# Patient Record
Sex: FEMALE | Race: BLACK OR AFRICAN AMERICAN | NOT HISPANIC OR LATINO | URBAN - METROPOLITAN AREA
[De-identification: names, ages, dates, MRNs, and addresses within clinical notes are randomized per-mention and may not be internally consistent; named-entity substitution may affect disease eponyms.]

---

## 2024-04-06 ENCOUNTER — HOSPITAL ENCOUNTER (EMERGENCY)
Facility: HOSPITAL | Age: 59
Discharge: HOME | End: 2024-04-06
Attending: EMERGENCY MEDICINE
Payer: MEDICAID

## 2024-04-06 VITALS
SYSTOLIC BLOOD PRESSURE: 138 MMHG | TEMPERATURE: 98 F | BODY MASS INDEX: 35.33 KG/M2 | OXYGEN SATURATION: 99 % | HEART RATE: 85 BPM | RESPIRATION RATE: 16 BRPM | DIASTOLIC BLOOD PRESSURE: 71 MMHG | WEIGHT: 192 LBS | HEIGHT: 62 IN

## 2024-04-06 DIAGNOSIS — M79.672 LEFT FOOT PAIN: Primary | ICD-10-CM

## 2024-04-06 PROCEDURE — 25000003 PHARM REV CODE 250: Mod: ER | Performed by: NURSE PRACTITIONER

## 2024-04-06 PROCEDURE — 99284 EMERGENCY DEPT VISIT MOD MDM: CPT | Mod: 25,ER

## 2024-04-06 PROCEDURE — 63600175 PHARM REV CODE 636 W HCPCS: Mod: ER | Performed by: NURSE PRACTITIONER

## 2024-04-06 PROCEDURE — 96372 THER/PROPH/DIAG INJ SC/IM: CPT | Performed by: NURSE PRACTITIONER

## 2024-04-06 RX ORDER — NAPROXEN 250 MG/1
500 TABLET ORAL
Status: COMPLETED | OUTPATIENT
Start: 2024-04-06 | End: 2024-04-06

## 2024-04-06 RX ORDER — NAPROXEN 500 MG/1
500 TABLET ORAL 2 TIMES DAILY WITH MEALS
Qty: 14 TABLET | Refills: 0 | Status: SHIPPED | OUTPATIENT
Start: 2024-04-06 | End: 2024-04-06

## 2024-04-06 RX ORDER — NAPROXEN 500 MG/1
500 TABLET ORAL 2 TIMES DAILY WITH MEALS
Qty: 14 TABLET | Refills: 0 | Status: SHIPPED | OUTPATIENT
Start: 2024-04-06

## 2024-04-06 RX ORDER — METHYLPREDNISOLONE SOD SUCC 125 MG
125 VIAL (EA) INJECTION
Status: COMPLETED | OUTPATIENT
Start: 2024-04-06 | End: 2024-04-06

## 2024-04-06 RX ADMIN — NAPROXEN 500 MG: 250 TABLET ORAL at 11:04

## 2024-04-06 RX ADMIN — METHYLPREDNISOLONE SODIUM SUCCINATE 125 MG: 125 INJECTION, POWDER, FOR SOLUTION INTRAMUSCULAR; INTRAVENOUS at 11:04

## 2024-04-06 NOTE — ED TRIAGE NOTES
"Pt c/o right foot pain and swelling for months. Pt states "I need to find out what it is. It just keep paining me." Pt ambulatory and limping. Not applying full weight to foot. Pt AAO x4. VSS   "

## 2024-04-06 NOTE — ED PROVIDER NOTES
Encounter Date: 4/6/2024       History     Chief Complaint   Patient presents with    Foot Pain     A 57 y/o female presents to the ED c/o chronic (left) foot pain. Pain intensified over the past few days. No home treatment.      Chief complaint:  Left foot pain    History of present illness: Patient is a 58-year-old female who reports atraumatic pain to the left foot that started yesterday.  She reports the pain has worsened there is associated swelling and difficulty walking.  She reports this happens periodically and that when it is happening she has the same issues exactly.  It usually self resolves without therapy.  She reports no medications or treatments have been used.    The history is provided by the patient. No  was used.     Review of patient's allergies indicates:  No Known Allergies  No past medical history on file.  No past surgical history on file.  No family history on file.     Review of Systems   Constitutional:  Negative for chills, fatigue and fever.   HENT:  Negative for congestion, ear discharge, ear pain, postnasal drip, rhinorrhea, sinus pressure, sneezing, sore throat and voice change.    Eyes:  Negative for discharge and itching.   Respiratory:  Negative for cough, shortness of breath and wheezing.    Cardiovascular:  Negative for chest pain, palpitations and leg swelling.   Gastrointestinal:  Negative for abdominal pain, constipation, diarrhea, nausea and vomiting.   Endocrine: Negative for polydipsia, polyphagia and polyuria.   Genitourinary:  Negative for dysuria, frequency, hematuria, urgency, vaginal bleeding, vaginal discharge and vaginal pain.   Musculoskeletal:  Positive for arthralgias and joint swelling. Negative for myalgias.   Skin:  Negative for rash and wound.   Neurological:  Negative for dizziness, seizures, syncope, weakness and numbness.   Hematological:  Negative for adenopathy. Does not bruise/bleed easily.   Psychiatric/Behavioral:  Negative for  self-injury and suicidal ideas. The patient is not nervous/anxious.        Physical Exam     Initial Vitals   BP Pulse Resp Temp SpO2   04/06/24 1011 04/06/24 1010 04/06/24 1010 04/06/24 1010 04/06/24 1010   (!) 144/75 91 18 98.2 °F (36.8 °C) 95 %      MAP       --                Physical Exam    Nursing note and vitals reviewed.  Constitutional: She appears well-developed and well-nourished.   HENT:   Head: Normocephalic and atraumatic.   Right Ear: External ear normal.   Left Ear: External ear normal.   Nose: Nose normal.   Eyes: Conjunctivae and EOM are normal. Pupils are equal, round, and reactive to light. Right eye exhibits no discharge. Left eye exhibits no discharge.   Neck:   Normal range of motion.  Abdominal: She exhibits no distension.   Musculoskeletal:         General: Normal range of motion.      Cervical back: Normal range of motion.      Left foot: Normal range of motion and normal capillary refill. Swelling (Of the dorsal side of the foot without redness or warmth or tenderness.) present. No deformity, bunion, Charcot foot, foot drop, prominent metatarsal heads, laceration, tenderness, bony tenderness or crepitus. Normal pulse.     Neurological: She is alert and oriented to person, place, and time.   Skin: Skin is dry. Capillary refill takes less than 2 seconds.         ED Course   Procedures  Labs Reviewed - No data to display       Imaging Results              X-Ray Foot Complete Left (Final result)  Result time 04/06/24 11:06:52      Final result by Adrien Francisco MD (04/06/24 11:06:52)                   Impression:      No convincing evidence of acute fracture or dislocation.      Electronically signed by: Adrien Francisco  Date:    04/06/2024  Time:    11:06               Narrative:    EXAMINATION:  XR FOOT COMPLETE 3 VIEW LEFT    CLINICAL HISTORY:  .  Pain in left foot    TECHNIQUE:  AP, lateral and oblique views of the left foot were performed.    COMPARISON:  None    FINDINGS:  No  definite evidence of acute fracture or dislocation.  Lisfranc joint appears congruent.  Joint spaces appear maintained.  Mild DJD of the great toe MTP joint.  Degenerate spurring is noted at the dorsal midfoot.    Minor enthesopathic change at the calcaneus.    Relatively diffuse soft tissue swelling.  Reticulation of subcutaneous fat planes could relate to edema, noting that cellulitis could appear similar.    No definite radiopaque foreign body.                                       Medications   methylPREDNISolone sodium succinate injection 125 mg (has no administration in time range)   naproxen tablet 500 mg (has no administration in time range)     Medical Decision Making  Patient is a 58-year-old female who reports atraumatic pain to the left foot that started yesterday.  She reports the pain has worsened there is associated swelling and difficulty walking.  She reports this happens periodically and that when it is happening she has the same issues exactly.  It usually self resolves without therapy.  She reports no medications or treatments have been used.    On physical exam there is swelling of the dorsal aspect of the foot without redness or warmth.  Distal pulse sensation and movement are intact.  Able to flex and extend of the ankle without difficulty or assistance.  Achilles tendon without abnormality.      Differential diagnosis includes plantar fasciitis, fracture dislocation sprain strain    Problems Addressed:  Left foot pain: acute illness or injury     Details: This does not represent septic joint plantar fasciitis fracture dislocation or sprain or strain.  It may represent a tendinopathy.  I will give the patient's steroids and anti-inflammatories and have her follow-up with podiatry.  A referral has been entered and a prescription written for naproxen.    Amount and/or Complexity of Data Reviewed  Radiology: ordered. Decision-making details documented in ED Course.  Discussion of management or  "test interpretation with external provider(s): Vital signs at the time of disposition were:  BP (!) 144/75 (BP Location: Right arm, Patient Position: Sitting)   Pulse 91   Temp 98.2 °F (36.8 °C) (Oral)   Resp 18   Ht 5' 2" (1.575 m)   Wt 87.1 kg (192 lb)   LMP  (Approximate)   SpO2 95%   BMI 35.12 kg/m²       See AVS for additional recommendations. Medications listed herein were prescribed after reviewing the patient's allergies, medication list, history, most recent laboratories as available.  Referrals below were provided after reviewing the patient's previous medical providers. She understands she  should return for any worsening or changes in condition.  Prior to discharge the patient was asked if she  had any additional concerns or complaints and she declined. The patient was given an opportunity to ask questions and all were answered to her satisfaction.     Risk  Prescription drug management.               ED Course as of 04/06/24 1120   Sat Apr 06, 2024   1041 BP(!): 144/75 [VC]   1041 Temp: 98.2 °F (36.8 °C) [VC]   1041 Temp Source: Oral [VC]   1041 Pulse: 91 [VC]   1041 Resp: 18 [VC]   1041 SpO2: 95 % [VC]      ED Course User Index  [VC] Teto Martinez DNP                           Clinical Impression:  Final diagnoses:  [M79.672] Left foot pain (Primary)          ED Disposition Condition    Discharge Stable          ED Prescriptions       Medication Sig Dispense Start Date End Date Auth. Provider    naproxen (NAPROSYN) 500 MG tablet Take 1 tablet (500 mg total) by mouth 2 (two) times daily with meals. 14 tablet 4/6/2024 -- Teto Martinez DNP          Follow-up Information       Follow up With Specialties Details Why Contact Info    Gladis Woodward DPM Podiatry, Wound Care Schedule an appointment as soon as possible for a visit   [address] [place] [address]  [place]os [place] [id number]  [phone]               Teto Martinez DNP  04/06/24 1120    "

## 2024-04-06 NOTE — DISCHARGE INSTRUCTIONS
You have been prescribed naprosyn (naproxen sodium), an anti-inflammatory.  Take this medication whether you feel you need it or not.  Do not take ibuprofen, naproxen or other NSAID's medications while taking this medication. Do not take prescribed medications for at least 8h after medications given in the Emergency Department.  Return to the Emergency Department for any worsening, change in condition, or any emergent concerns.